# Patient Record
Sex: FEMALE | Race: WHITE | URBAN - METROPOLITAN AREA
[De-identification: names, ages, dates, MRNs, and addresses within clinical notes are randomized per-mention and may not be internally consistent; named-entity substitution may affect disease eponyms.]

---

## 2017-01-01 ENCOUNTER — APPOINTMENT (OUTPATIENT)
Dept: CT IMAGING | Facility: CLINIC | Age: 82
End: 2017-01-01
Attending: EMERGENCY MEDICINE
Payer: MEDICARE

## 2017-01-01 ENCOUNTER — MEDICAL CORRESPONDENCE (OUTPATIENT)
Dept: HEALTH INFORMATION MANAGEMENT | Facility: CLINIC | Age: 82
End: 2017-01-01

## 2017-01-01 ENCOUNTER — HOSPITAL ENCOUNTER (EMERGENCY)
Facility: CLINIC | Age: 82
Discharge: HOME | End: 2017-10-28
Attending: EMERGENCY MEDICINE | Admitting: EMERGENCY MEDICINE
Payer: MEDICARE

## 2017-01-01 ENCOUNTER — HOSPITAL LABORATORY (OUTPATIENT)
Facility: OTHER | Age: 82
End: 2017-01-01

## 2017-01-01 VITALS
SYSTOLIC BLOOD PRESSURE: 119 MMHG | DIASTOLIC BLOOD PRESSURE: 50 MMHG | OXYGEN SATURATION: 98 % | RESPIRATION RATE: 13 BRPM | TEMPERATURE: 97.6 F

## 2017-01-01 DIAGNOSIS — E86.0 DEHYDRATION: ICD-10-CM

## 2017-01-01 DIAGNOSIS — F03.90 DEMENTIA WITHOUT BEHAVIORAL DISTURBANCE, UNSPECIFIED DEMENTIA TYPE: ICD-10-CM

## 2017-01-01 DIAGNOSIS — R55 SYNCOPE, UNSPECIFIED SYNCOPE TYPE: ICD-10-CM

## 2017-01-01 LAB
ALBUMIN SERPL-MCNC: 3.8 G/DL (ref 3.4–5)
ALP SERPL-CCNC: 56 U/L (ref 40–150)
ALT SERPL W P-5'-P-CCNC: 17 U/L (ref 0–50)
ANION GAP SERPL CALCULATED.3IONS-SCNC: 11 MMOL/L (ref 3–14)
ANION GAP SERPL CALCULATED.3IONS-SCNC: 11 MMOL/L (ref 3–14)
AST SERPL W P-5'-P-CCNC: 15 U/L (ref 0–45)
BASOPHILS # BLD AUTO: 0 10E9/L (ref 0–0.2)
BASOPHILS NFR BLD AUTO: 0.2 %
BILIRUB SERPL-MCNC: 0.5 MG/DL (ref 0.2–1.3)
BUN SERPL-MCNC: 50 MG/DL (ref 7–30)
BUN SERPL-MCNC: 85 MG/DL (ref 7–30)
CALCIUM SERPL-MCNC: 8.9 MG/DL (ref 8.5–10.1)
CALCIUM SERPL-MCNC: 9.3 MG/DL (ref 8.5–10.1)
CHLORIDE SERPL-SCNC: 102 MMOL/L (ref 94–109)
CHLORIDE SERPL-SCNC: 109 MMOL/L (ref 94–109)
CHOLEST SERPL-MCNC: 116 MG/DL
CO2 SERPL-SCNC: 20 MMOL/L (ref 20–32)
CO2 SERPL-SCNC: 20 MMOL/L (ref 20–32)
CREAT SERPL-MCNC: 1.79 MG/DL (ref 0.52–1.04)
CREAT SERPL-MCNC: 2.65 MG/DL (ref 0.52–1.04)
DEPRECATED CALCIDIOL+CALCIFEROL SERPL-MC: 29 UG/L (ref 20–75)
DIFFERENTIAL METHOD BLD: ABNORMAL
EOSINOPHIL # BLD AUTO: 0.1 10E9/L (ref 0–0.7)
EOSINOPHIL NFR BLD AUTO: 1.2 %
ERYTHROCYTE [DISTWIDTH] IN BLOOD BY AUTOMATED COUNT: 13.4 % (ref 10–15)
ERYTHROCYTE [DISTWIDTH] IN BLOOD BY AUTOMATED COUNT: ABNORMAL % (ref 10–15)
FOLATE RBC-MCNC: NORMAL NG/ML
GFR SERPL CREATININE-BSD FRML MDRD: 17 ML/MIN/1.7M2
GFR SERPL CREATININE-BSD FRML MDRD: 27 ML/MIN/1.7M2
GLUCOSE SERPL-MCNC: 183 MG/DL (ref 70–99)
GLUCOSE SERPL-MCNC: 93 MG/DL (ref 70–99)
HCT VFR BLD AUTO: 29.4 % (ref 35–47)
HCT VFR BLD AUTO: 33.5 % (ref 35–47)
HCT VFR BLD CALC: 39.7 %
HDLC SERPL-MCNC: 56 MG/DL
HGB BLD-MCNC: 10.6 G/DL (ref 11.7–15.7)
HGB BLD-MCNC: 10.6 G/DL (ref 11.7–15.7)
IMM GRANULOCYTES # BLD: 0.1 10E9/L (ref 0–0.4)
IMM GRANULOCYTES NFR BLD: 0.7 %
LDLC SERPL CALC-MCNC: 31 MG/DL
LYMPHOCYTES # BLD AUTO: 1 10E9/L (ref 0.8–5.3)
LYMPHOCYTES NFR BLD AUTO: 9.2 %
MCH RBC QN AUTO: 30.5 PG (ref 26.5–33)
MCH RBC QN AUTO: 38.8 PG (ref 26.5–33)
MCHC RBC AUTO-ENTMCNC: 31.6 G/DL (ref 31.5–36.5)
MCHC RBC AUTO-ENTMCNC: 36.1 G/DL (ref 31.5–36.5)
MCV RBC AUTO: 108 FL (ref 78–100)
MCV RBC AUTO: 97 FL (ref 78–100)
MONOCYTES # BLD AUTO: 1 10E9/L (ref 0–1.3)
MONOCYTES NFR BLD AUTO: 9.4 %
NEUTROPHILS # BLD AUTO: 8.6 10E9/L (ref 1.6–8.3)
NEUTROPHILS NFR BLD AUTO: 79.3 %
NONHDLC SERPL-MCNC: 60 MG/DL
PLATELET # BLD AUTO: 260 10E9/L (ref 150–450)
PLATELET # BLD AUTO: 280 10E9/L (ref 150–450)
POTASSIUM SERPL-SCNC: 4 MMOL/L (ref 3.4–5.3)
POTASSIUM SERPL-SCNC: 4.1 MMOL/L (ref 3.4–5.3)
PROT SERPL-MCNC: 7.7 G/DL (ref 6.8–8.8)
RBC # BLD AUTO: 2.73 10E12/L (ref 3.8–5.2)
RBC # BLD AUTO: 3.47 10E12/L (ref 3.8–5.2)
SODIUM SERPL-SCNC: 133 MMOL/L (ref 133–144)
SODIUM SERPL-SCNC: 140 MMOL/L (ref 133–144)
TRIGL SERPL-MCNC: 146 MG/DL
TROPONIN I SERPL-MCNC: <0.015 UG/L (ref 0–0.04)
TSH SERPL DL<=0.005 MIU/L-ACNC: 0.52 MU/L (ref 0.4–4)
VIT B12 SERPL-MCNC: 2315 PG/ML (ref 193–986)
WBC # BLD AUTO: 10.8 10E9/L (ref 4–11)
WBC # BLD AUTO: 9.1 10E9/L (ref 4–11)

## 2017-01-01 PROCEDURE — 93005 ELECTROCARDIOGRAM TRACING: CPT | Performed by: EMERGENCY MEDICINE

## 2017-01-01 PROCEDURE — 85025 COMPLETE CBC W/AUTO DIFF WBC: CPT | Performed by: EMERGENCY MEDICINE

## 2017-01-01 PROCEDURE — 96360 HYDRATION IV INFUSION INIT: CPT | Performed by: EMERGENCY MEDICINE

## 2017-01-01 PROCEDURE — 25000128 H RX IP 250 OP 636: Performed by: EMERGENCY MEDICINE

## 2017-01-01 PROCEDURE — 93010 ELECTROCARDIOGRAM REPORT: CPT | Mod: Z6 | Performed by: EMERGENCY MEDICINE

## 2017-01-01 PROCEDURE — 84484 ASSAY OF TROPONIN QUANT: CPT | Performed by: EMERGENCY MEDICINE

## 2017-01-01 PROCEDURE — 99285 EMERGENCY DEPT VISIT HI MDM: CPT | Mod: 25 | Performed by: EMERGENCY MEDICINE

## 2017-01-01 PROCEDURE — 70450 CT HEAD/BRAIN W/O DYE: CPT

## 2017-01-01 PROCEDURE — 80048 BASIC METABOLIC PNL TOTAL CA: CPT | Performed by: EMERGENCY MEDICINE

## 2017-01-01 PROCEDURE — 96361 HYDRATE IV INFUSION ADD-ON: CPT | Performed by: EMERGENCY MEDICINE

## 2017-01-01 RX ADMIN — SODIUM CHLORIDE 1000 ML: 9 INJECTION, SOLUTION INTRAVENOUS at 00:27

## 2017-01-01 RX ADMIN — SODIUM CHLORIDE 1000 ML: 9 INJECTION, SOLUTION INTRAVENOUS at 23:22

## 2017-10-27 NOTE — ED AVS SNAPSHOT
[facility] Emergency Department    [address] [address]    Phone:  [phone]    Fax:  [phone]                                       Anamika De Souza   MRN: [id number]    Department:  [facility] Emergency Department   Date of Visit:  10/27/2017           Patient Information     Date Of Birth          1935        Your diagnoses for this visit were:     Syncope, unspecified syncope type     Dehydration     Dementia without behavioral disturbance, unspecified dementia type        You were seen by Juan Flores MD.      24 Hour Appointment Hotline       To make an appointment at any [facility] clinic, call [phone] ([phone]). If you don't have a family doctor or clinic, we will help you find one. [facility] clinics are conveniently located to serve the needs of you and your family.          ED Discharge Orders     PALLIATIVE CARE REFERRAL       Your provider has referred you to Palliative Care Services.    To schedule an Outpatient Palliative Care Referral appointment, please call: [facility]: [facility] [phone]   [email or web address]    Please be aware that coverage of these services is subject to the terms and limitations of your health insurance plan.  Call member services at your health plan with any benefit or coverage questions.      Please bring the following with you to your appointment:    (1) Any X-Rays, CTs or MRIs which have been performed.  Contact the facility where they were done to arrange for  prior to your scheduled appointment.   (2) If you have recently seen a provider outside of the [facility], please bring your most recent clinic note and/or imaging results  (3) List of current medications - please bring ALL of the medications that you are currently taking (in their original bottles) to your appointment  (4) This referral request  (5) Any documents/labs given to you for this  referral    Services Requested: Consult and make recommendations, Evaluate and treat symptoms (including writing prescriptions) and Explore goals of care    Please complete the following questions:  1. What is patient's life-limiting diagnosis? dementia  2. What is the reason for the referral? dementia  3. What is the patient's prognosis? poor    Palliative Care Definition:    Palliative Care is specialized medical care for people with serious illness.  This type of care is focused on providing patients with relief from symptoms, pain and stresses of serious illness - whatever the diagnosis may be.  The goal of Palliative Care is to improve quality of life for both the patient and the family.  Palliative Care is provided by a team of doctors, nurses and other specialists who work with the patient's other doctors to provide an extra layer of support.  Palliative Care is appropriate at any age and at any stage in a serious illness, and can be provided together with curative treatment.                     Review of your medicines      Our records show that you are taking the medicines listed below. If these are incorrect, please call your family doctor or clinic.        Dose / Directions Last dose taken    * AMITRIPTYLINE HCL PO   Dose:  10-20 mg        Take 10-20 mg by mouth At Bedtime   Refills:  0        * AMITRIPTYLINE HCL PO   Dose:  10 mg        Take 10 mg by mouth At Bedtime   Refills:  0        aspirin 81 MG tablet   Dose:  81 mg        Take 81 mg by mouth daily   Refills:  0        ASPIRIN PO   Dose:  81 mg        Take 81 mg by mouth daily   Refills:  0        CALCIUM 600 PO   Dose:  2 tablet        Take 2 tablets by mouth 3 times daily   Refills:  0        CYCLOBENZAPRINE HCL PO   Dose:  10 mg        Take 10 mg by mouth 3 times daily as needed for muscle spasms   Refills:  0        DONEPEZIL HCL PO   Dose:  10 mg        Take 10 mg by mouth At Bedtime   Refills:  0        * GABAPENTIN PO   Dose:  100 mg         Take 100 mg by mouth 3 times daily   Refills:  0        * NEURONTIN PO   Dose:  600 mg        Take 600 mg by mouth 2 times daily   Refills:  0        * HYDROCHLOROTHIAZIDE PO   Dose:  25 mg        Take 25 mg by mouth daily   Refills:  0        * HYDROCHLOROTHIAZIDE PO   Dose:  25 mg        Take 25 mg by mouth daily as needed   Refills:  0        HYDROcodone-acetaminophen 5-325 MG per tablet   Commonly known as:  NORCO   Dose:  1-2 tablet        Take 1-2 tablets by mouth every 6 hours as needed for moderate to severe pain   Refills:  0        ICAPS AREDS FORMULA PO   Dose:  1 tablet        Take 1 tablet by mouth 2 times daily   Refills:  0        * KLOR-CON 10 PO   Dose:  10 mEq        Take 10 mEq by mouth daily   Refills:  0        * KLOR-CON 10 PO   Dose:  1 tablet        Take 1 tablet by mouth daily   Refills:  0        METOPROLOL SUCCINATE ER PO   Dose:  100 mg        Take 100 mg by mouth daily   Refills:  0        METOPROLOL TARTRATE PO   Dose:  100 mg        Take 100 mg by mouth daily   Refills:  0        NAMENDA XR 28 MG 24 hr capsule   Dose:  28 mg   Generic drug:  memantine XR        Take 28 mg by mouth daily   Refills:  0        ondansetron 4 MG ODT tab   Commonly known as:  ZOFRAN-ODT   Dose:  4 mg        Take 4 mg by mouth every 8 hours as needed for nausea   Refills:  0        OSTEO BI-FLEX REGULAR STRENGTH PO   Dose:  1 tablet        Take 1 tablet by mouth 2 times daily   Refills:  0        * SIMVASTATIN PO   Dose:  40 mg        Take 40 mg by mouth At Bedtime   Refills:  0        * SIMVASTATIN PO   Dose:  40 mg        Take 40 mg by mouth At Bedtime   Refills:  0        TIZANIDINE HCL PO   Dose:  2 mg        Take 2 mg by mouth 2 times daily   Refills:  0        TRAMADOL HCL PO   Dose:  50 mg        Take 50 mg by mouth every 4 hours as needed for moderate to severe pain   Refills:  0        * Notice:  This list has 10 medication(s) that are the same as other medications prescribed for you. Read the  directions carefully, and ask your doctor or other care provider to review them with you.            Procedures and tests performed during your visit     Basic metabolic panel    CBC with platelets differential    CT Head w/o Contrast    EKG 12-lead, tracing only    Peripheral IV catheter    Troponin I      Orders Needing Specimen Collection     None      Pending Results     No orders found for last 3 day(s).            Pending Culture Results     No orders found for last 3 day(s).            Pending Results Instructions     If you had any lab results that were not finalized at the time of your Discharge, you can call the ED Lab Result RN at [phone]. You will be contacted by this team for any positive Lab results or changes in treatment. The nurses are available 7 days a week from 10A to 6:30P.  You can leave a message 24 hours per day and they will return your call.        Test Results From Your Hospital Stay        10/28/2017 12:06 AM      Component Results     Component Value Ref Range & Units Status    Sodium 133 133 - 144 mmol/L Final    Specimen slightly hemolyzed, potassium may be falsely elevated    Potassium 4.1 3.4 - 5.3 mmol/L Final    Chloride 102 94 - 109 mmol/L Final    Carbon Dioxide 20 20 - 32 mmol/L Final    Anion Gap 11 3 - 14 mmol/L Final    Glucose 183 (H) 70 - 99 mg/dL Final    Urea Nitrogen 85 (H) 7 - 30 mg/dL Final    Creatinine 2.65 (H) 0.52 - 1.04 mg/dL Final    GFR Estimate 17 (L) >60 mL/min/1.7m2 Final    Non  GFR Calc    GFR Estimate If Black 21 (L) >60 mL/min/1.7m2 Final    African American GFR Calc    Calcium 8.9 8.5 - 10.1 mg/dL Final         10/27/2017 11:33 PM      Component Results     Component Value Ref Range & Units Status    WBC 10.8 4.0 - 11.0 10e9/L Final    RBC Count 2.73 (L) 3.8 - 5.2 10e12/L Final    Hemoglobin 10.6 (L) 11.7 - 15.7 g/dL Final    Hematocrit 29.4 (L) 35.0 - 47.0 % Final     (H) 78 - 100 fl Final    MCH 38.8 (H) 26.5 - 33.0 pg Final     MCHC 36.1 31.5 - 36.5 g/dL Final    RDW  10.0 - 15.0 % Final    Dimorphic population - unable to calculate    Platelet Count 280 150 - 450 10e9/L Final    Diff Method Automated Method  Final    % Neutrophils 79.3 % Final    % Lymphocytes 9.2 % Final    % Monocytes 9.4 % Final    % Eosinophils 1.2 % Final    % Basophils 0.2 % Final    % Immature Granulocytes 0.7 % Final    Absolute Neutrophil 8.6 (H) 1.6 - 8.3 10e9/L Final    Absolute Lymphocytes 1.0 0.8 - 5.3 10e9/L Final    Absolute Monocytes 1.0 0.0 - 1.3 10e9/L Final    Absolute Eosinophils 0.1 0.0 - 0.7 10e9/L Final    Absolute Basophils 0.0 0.0 - 0.2 10e9/L Final    Abs Immature Granulocytes 0.1 0 - 0.4 10e9/L Final         10/28/2017 12:06 AM      Component Results     Component Value Ref Range & Units Status    Troponin I ES <0.015 0.000 - 0.045 ug/L Final    The 99th percentile for upper reference range is 0.045 ug/L.  Troponin values   in the range of 0.045 - 0.120 ug/L may be associated with risks of adverse   clinical events.           10/27/2017 11:40 PM      Narrative     CT HEAD W/O CONTRAST 10/27/2017 11:37 PM      HISTORY: Confusion. Fall. Syncope versus seizure.    TECHNIQUE: Routine noncontrast head CT. Radiation dose for this scan  was reduced using automated exposure control, adjustment of the mA  and/or kV according to patient size, or iterative reconstruction  technique.    COMPARISON: 6/20/2016.    FINDINGS: There is generalized brain atrophy. No mass, mass effect or  intracranial hemorrhage. No evidence of acute infarct.   Periventricular low attenuation is consistent with chronic small  vessel ischemic disease. The visualized paranasal sinuses are clear.        Impression     IMPRESSION:  No acute abnormality.    CARL HANSEN MD                Thank you for choosing [facility]       Thank you for choosing [facility] for your care. Our goal is always to provide you with excellent care. Hearing back from our patients is one way we can  "continue to improve our services. Please take a few minutes to complete the written survey that you may receive in the mail after you visit with us. Thank you!        [employer] Information     [employer] lets you send messages to your doctor, view your test results, renew your prescriptions, schedule appointments and more. To sign up, go to [email or web address] . Click on \"Log in\" on the left side of the screen, which will take you to the Welcome page. Then click on \"Sign up Now\" on the right side of the page.     You will be asked to enter the access code listed below, as well as some personal information. Please follow the directions to create your username and password.     Your access code is: [email or web address]  Expires: 2018  1:10 AM     Your access code will  in 90 days. If you need help or a new code, please call your [facility] clinic or [phone].        Care EveryWhere ID     This is your Care EveryWhere ID. This could be used by other organizations to access your [facility] medical records  [id number]        Equal Access to Services     [facility] UCLAMARRY : Hadgerman aPinter, waitzel saha, jane park, chema rowe. So [facility] [phone].    ATENCIÓN: Si habla español, tiene a mantilla disposición servicios gratuitos de asistencia lingüística. Ari al [phone].    We comply with applicable federal civil rights laws and [place] laws. We do not discriminate on the basis of race, color, national origin, age, disability, sex, sexual orientation, or gender identity.            After Visit Summary       This is your record. Keep this with you and show to your community pharmacist(s) and doctor(s) at your next visit.                  "

## 2017-10-27 NOTE — ED AVS SNAPSHOT
[facility] Emergency Department    [address] [address]    Phone:  [phone]    Fax:  [phone]                                       Anamika De Souza   MRN: [id number]    Department:  [facility] Emergency Department   Date of Visit:  10/27/2017           After Visit Summary Signature Page     I have received my discharge instructions, and my questions have been answered. I have discussed any challenges I see with this plan with the nurse or doctor.    ..........................................................................................................................................  Patient/Patient Representative Signature      ..........................................................................................................................................  Patient Representative Print Name and Relationship to Patient    ..................................................               ................................................  Date                                            Time    ..........................................................................................................................................  Reviewed by Signature/Title    ...................................................              ..............................................  Date                                                            Time

## 2017-10-28 NOTE — ED NOTES
Attempted to call report to pt's memory care unit facility. No unable to contact. Discharge paperwork will be sent with pt.

## 2017-10-28 NOTE — ED NOTES
Pt presents to ED via EMS from a memory care unit after episode of syncope, incontinent of both bowel and bladder at the time. Pt oriented to self only. Does not recall the syncopal event. Denies any pain. MD at bedside for assessment. Started on Lexapro 3 days ago.

## 2017-10-28 NOTE — ED PROVIDER NOTES
History     Chief Complaint   Patient presents with     Loss of Consciousness     started on lexapro 3 days ago     HPI  Anamika De Souza is a 82 year old female with a history of dementia who presents for syncopal episodes.  History obtained from the patient, EMS, the family, and review of the chart.  The patient has significant dementia and does not remember what happened tonight.  Apparently the patient has had several syncopal episodes over the past 3 days ever since starting Lexapro for depression.  She was recently placed in a memory care unit after the death of her .  Today she had another syncopal episode, no clear head injury reported, however she was incontinent of urine and stool during this episode.  She currently denies any complaints, states she feels rundown.  She specifically denies headache, fever, chest pain, shortness of breath, cough, sore throat, abdominal pain, nausea, vomiting, diarrhea, dysuria or rash.    Problem List:    Patient Active Problem List    Diagnosis Date Noted     Lumbago 07/08/2014     Priority: Medium     Scoliosis (and kyphoscoliosis), idiopathic 07/08/2014     Priority: Medium     DDD (degenerative disc disease), lumbar 07/08/2014     Priority: Medium     Lumbosacral spondylosis without myelopathy 07/08/2014     Priority: Medium     Abnormal gait 07/08/2014     Priority: Medium        Past Medical History:    Past Medical History:   Diagnosis Date     Abnormality of Gait 10/1/2009       Past Surgical History:    No past surgical history on file.    Family History:    No family history on file.    Social History:  Marital Status:   [5]  Social History   Substance Use Topics     Smoking status: Not on file     Smokeless tobacco: Not on file     Alcohol use Not on file        Medications:      AMITRIPTYLINE HCL PO   Gabapentin (NEURONTIN PO)   SIMVASTATIN PO   METOPROLOL SUCCINATE ER PO   DONEPEZIL HCL PO   TIZANIDINE HCL PO   memantine XR (NAMENDA XR) 28 MG  capsule   HYDROCHLOROTHIAZIDE PO   Potassium Chloride (KLOR-CON 10 PO)   HYDROcodone-acetaminophen (NORCO) 5-325 MG per tablet   CYCLOBENZAPRINE HCL PO   TRAMADOL HCL PO   ondansetron (ZOFRAN-ODT) 4 MG disintegrating tablet   Calcium Carbonate (CALCIUM 600 PO)   ASPIRIN PO   Glucosamine-Chondroitin (OSTEO BI-FLEX REGULAR STRENGTH PO)   aspirin 81 MG tablet   Multiple Vitamins-Minerals (ICAPS AREDS FORMULA PO)   HYDROCHLOROTHIAZIDE PO   Potassium Chloride (KLOR-CON 10 PO)   AMITRIPTYLINE HCL PO   METOPROLOL TARTRATE PO   SIMVASTATIN PO   GABAPENTIN PO         Review of Systems  Pertinent positives and negatives listed in the HPI, all other systems reviewed and are negative.    Physical Exam   BP: 117/78  Heart Rate: 62  Temp: 97.6  F (36.4  C)  Resp: 16  SpO2: 96 %      Physical Exam   Constitutional: She appears well-developed and well-nourished. She appears distressed.   HENT:   Head: Normocephalic and atraumatic.   Right Ear: External ear normal.   Left Ear: External ear normal.   Nose: Nose normal.   Eyes: Conjunctivae are normal. No scleral icterus.   Neck: Normal range of motion.   Cardiovascular: Normal rate and regular rhythm.    Pulmonary/Chest: Effort normal. No stridor. No respiratory distress.   Abdominal: Soft. She exhibits no distension. There is no tenderness. There is no rebound and no guarding.   Neurological: She is alert. She is disoriented (oriented to self only). She displays no tremor. No cranial nerve deficit. She exhibits normal muscle tone. GCS eye subscore is 4. GCS verbal subscore is 5. GCS motor subscore is 6.   Skin: Skin is warm and dry. She is not diaphoretic.   Psychiatric: She has a normal mood and affect. Her behavior is normal.   Nursing note and vitals reviewed.      ED Course     ED Course     Procedures               EKG Interpretation:      Interpreted by Juan Flores  Time reviewed: 2325  Symptoms at time of EKG: None   Rhythm: normal sinus   Rate: 56  Axis:  Normal  Ectopy: none  Conduction: right bundle branch block (complete)  ST Segments/ T Waves: No acute ischemic changes  Q Waves: none  Comparison to prior: No old EKG available    Clinical Impression: RBBB      Critical Care time:  none             Labs Ordered and Resulted from Time of ED Arrival Up to the Time of Departure from the ED   BASIC METABOLIC PANEL - Abnormal; Notable for the following:        Result Value    Glucose 183 (*)     Urea Nitrogen 85 (*)     Creatinine 2.65 (*)     GFR Estimate 17 (*)     GFR Estimate If Black 21 (*)     All other components within normal limits   CBC WITH PLATELETS DIFFERENTIAL - Abnormal; Notable for the following:     RBC Count 2.73 (*)     Hemoglobin 10.6 (*)     Hematocrit 29.4 (*)      (*)     MCH 38.8 (*)     Absolute Neutrophil 8.6 (*)     All other components within normal limits   TROPONIN I   PERIPHERAL IV CATHETER       Assessments & Plan (with Medical Decision Making)   82-year-old female who presents for syncopal episodes.  Differential includes medication side effect, dysrhythmia, seizure, vasovagal syncope, dehydration, anemia.  Blood pressure 117/78, heart rate 62, SPO2 96% on room air, temperature 36.4 C.  She is confused on examination, however this seems to be her baseline.  Nonfocal examination otherwise.  EKG shows right bundle branch block.  Hemoglobin 10.6, stable from 1 week ago.  CT of the head obtained, images reviewed independently as well as radiology reviewed, no intracranial hemorrhage or mass effect.  Creatinine and BUN are both elevated from her baseline.  Creatinine is 2.65 up from 1.79 last week.  This likely supports dehydration as a strong contributing factor.  I discussed this with the patient's stepdaughter and son-in-law, and they agree stating that the patient has not been eating and drinking much lately.  I had a long discussion with the patient's family regarding goals were for care.  We discussed the different options for  people in situations like this including admission for close heart monitoring versus home management.  After a careful weighing of the pros and cons of each side, it was decided that the best course would be to send her back home after she receives IV fluid to improve her dehydration.  Reviewing her medications, it seems her primary doctor has been trying to discontinue many medications, and it may be worthwhile to stop the hydrochlorothiazide and/or the metoprolol to prevent further hypotensive episodes that may be causing her syncopal episodes.  He had a long conversation about hospice and palliative care and what those mean and they are interested in receiving a consult regarding those cares.  This is provided.  I think this this is a good plan for the patient given her age and significant dementia.  I discussed my concern that likely she will continue to deteriorate as it is unlikely that she will suddenly start to feed and hydrate herself again.  They understood this and were appreciative of the conversation.  Therefore after IV rehydration the patient is discharged back to the nursing home for further care.  They're told to return at any time if they are concerned about how she is doing work there is change in their plan and they agree with this.    I have reviewed the nursing notes.    I have reviewed the findings, diagnosis, plan and need for follow up with the patient.       New Prescriptions    No medications on file       Final diagnoses:   Syncope, unspecified syncope type   Dehydration   Dementia without behavioral disturbance, unspecified dementia type       10/27/2017   [facility] EMERGENCY DEPARTMENT     Juan Flores MD  10/28/17 0030

## 2017-10-28 NOTE — ED NOTES
Assisted moving patient from ambulance cot to our cot, changed patient into gown, hooked to bp cuff, 02 sensor, heart monitor, took temp.

## 2021-05-25 ENCOUNTER — RECORDS - HEALTHEAST (OUTPATIENT)
Dept: ADMINISTRATIVE | Facility: CLINIC | Age: 86
End: 2021-05-25

## 2021-05-26 ENCOUNTER — RECORDS - HEALTHEAST (OUTPATIENT)
Dept: ADMINISTRATIVE | Facility: CLINIC | Age: 86
End: 2021-05-26

## 2021-05-27 ENCOUNTER — RECORDS - HEALTHEAST (OUTPATIENT)
Dept: ADMINISTRATIVE | Facility: CLINIC | Age: 86
End: 2021-05-27

## 2021-05-28 ENCOUNTER — RECORDS - HEALTHEAST (OUTPATIENT)
Dept: ADMINISTRATIVE | Facility: CLINIC | Age: 86
End: 2021-05-28

## 2021-05-29 ENCOUNTER — RECORDS - HEALTHEAST (OUTPATIENT)
Dept: ADMINISTRATIVE | Facility: CLINIC | Age: 86
End: 2021-05-29

## 2021-05-30 ENCOUNTER — RECORDS - HEALTHEAST (OUTPATIENT)
Dept: ADMINISTRATIVE | Facility: CLINIC | Age: 86
End: 2021-05-30

## 2021-05-31 ENCOUNTER — RECORDS - HEALTHEAST (OUTPATIENT)
Dept: ADMINISTRATIVE | Facility: CLINIC | Age: 86
End: 2021-05-31

## 2021-06-02 ENCOUNTER — RECORDS - HEALTHEAST (OUTPATIENT)
Dept: ADMINISTRATIVE | Facility: CLINIC | Age: 86
End: 2021-06-02

## 2021-07-13 ENCOUNTER — RECORDS - HEALTHEAST (OUTPATIENT)
Dept: ADMINISTRATIVE | Facility: CLINIC | Age: 86
End: 2021-07-13

## 2021-07-21 ENCOUNTER — RECORDS - HEALTHEAST (OUTPATIENT)
Dept: ADMINISTRATIVE | Facility: CLINIC | Age: 86
End: 2021-07-21